# Patient Record
Sex: FEMALE | Race: BLACK OR AFRICAN AMERICAN | NOT HISPANIC OR LATINO | Employment: UNEMPLOYED | URBAN - METROPOLITAN AREA
[De-identification: names, ages, dates, MRNs, and addresses within clinical notes are randomized per-mention and may not be internally consistent; named-entity substitution may affect disease eponyms.]

---

## 2017-10-01 ENCOUNTER — HISTORICAL (OUTPATIENT)
Dept: ADMINISTRATIVE | Facility: HOSPITAL | Age: 47
End: 2017-10-01

## 2017-10-04 LAB — FINAL CULTURE: NORMAL

## 2019-07-09 ENCOUNTER — HISTORICAL (OUTPATIENT)
Dept: ADMINISTRATIVE | Facility: HOSPITAL | Age: 49
End: 2019-07-09

## 2022-04-09 ENCOUNTER — HISTORICAL (OUTPATIENT)
Dept: ADMINISTRATIVE | Facility: HOSPITAL | Age: 52
End: 2022-04-09

## 2022-04-29 VITALS
BODY MASS INDEX: 38.02 KG/M2 | HEIGHT: 66 IN | BODY MASS INDEX: 37.91 KG/M2 | DIASTOLIC BLOOD PRESSURE: 89 MMHG | HEIGHT: 66 IN | WEIGHT: 235.88 LBS | SYSTOLIC BLOOD PRESSURE: 152 MMHG | WEIGHT: 236.56 LBS

## 2022-05-02 NOTE — HISTORICAL [facility]
This is a historical note converted from [employer]. Formatting and pictures may have been removed.  Please reference Royer for original formatting and attached multimedia. Procedure Name  Date/Time:7/9/2019 11:16:53  Procedure:??Left?de Quervains tenosynovitis injection  Indications:??Diagnostic and Therapeutic Indication - decrease pain, increase range of motion and improve quality of life  RISKS: Possible complications with injection include?bleeding, infection (0.01%), tendon rupture, steroid flare, fat pad or soft tissue atrophy, skin depigmentation, and vasovagal response. ?(Steroid flair treatment is rest, ice, NSAIDs and resolves in 24-36 hours.)  Consent:???Procedure, risks, benefits, & alternatives explained to patient, who voiced understanding and agreed to proceed with procedure. ?Consent signed and?scanned into the medical record. No absolute contraindications (cellulitis overlying joint, infection, lack of informed consent, allergy to injection mediation, mary protein or egg allergy for sodium hyaluronate, or history of steroid flare) or relative contraindications (brittle or out of control DM HgA1c > 10, coagulopathy INR > 3.5, previous joint replacement, or history of AVN).  Description:?Time out performed. The patient was prepped?using Chlorhexidine scrub after the appropriate?identification of anatomic landmarks.? Sterile needle used (size # 21 gauge, length 1.5 inch)?Topical anesthetic of ethyl chloride was used.? ?1?mL of 1% lidocaine plain with 40 mg of Kenalog injected.  Complications:?None?  EBL:??None  Post Procedure:?Patient reports improvement in pain and ROM. Patient alert, moving all extremities. ?Good peripheral pulses, no signs of vascular compromise, range of motion intact. ?Patient tolerated procedure well. ?Aftercare instructions were given to patient at time of discharge.??Relative rest for 3 days - avoiding excessive activity. ?Pendulum stretching exercises followed by stretching  exercises daily?starting on day 4.? Place ice on area for 15 minutes every 4 to 6 hours.??Tylenol 1000mg twice a day or ibuprofen 600 mg three times per day for next 3-4 days if not on medication already. ?Protect the area for the next 1-8 hours if anesthetic was used. ?Avoid excessive activity for next 3 to 4 weeks.?ER precautions for fever, severe joint pain, or allergic reaction or other new symptoms related to joint injection.

## 2022-05-02 NOTE — HISTORICAL [facility]
This is a historical note converted from Royer. Formatting and pictures may have been removed.  Please reference Royer for original formatting and attached multimedia. Chief Complaint  Referred for Lt wrist/ Thumb tendonitis  History of Present Illness  49?yo?female?non-smoker?presents to ortho clinic for?initial visit?for?left?hand pain.? Patient points to??thumb, lateral?wrist radial?side. Patient dominate hand:? ?right  Today:? New patient referral for left thumb, wrist pain  Onset:??several months??progressively worsening  Current pain level: 10/10??without pain medication. Quality described as??sharp?.? Patient?denies?use of pain medication today.?  Medications r/t complaint: Patient reports using?RX / OTC?medications in past including:?OTC pain relievers,?gabapentin, Norco?RX per PCP. ?Currently taking?OTC Aleve??PRN?pain with?mild?relief.?  Modifying Factors: ?Worse with/after activity;Improved with rest;??No stiffness??  Injury:?Denies  Previous treatment: HEP?denies?; RX NSAIDs, PT??denies ?, CSI denies  Associated Symptoms:?No Crepitus/Grinding; ?Numbness/ tingling? left hand;??Swelling noted ;?No skin changes;?Weakness of ?left hand;?Mild decrease in ROM;?difficulty sleeping at night s/t pain  Activity:?Sedentary, full ADLs;?Pain interferes with function/daily activity (mild)  Family History:?Family history of arthritis  PMH:? denies CVD; denies DM ; denies RA; denies gout; denies RX anticoagulants; denies intolerance to NSAIDs s/t GI issues; denies ?intolerance to NSAIDs s/t kidney disease  PCP:?? 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  Note:? none  Review of Systems  Constitutional:No fever;No chills;No weight loss  CV:No swelling;No edema  GI:No urinary retention;No urinary incontinence  :No fecal incontinence  Skin:No rash;No wound  Neuro:No numbness/tingling;No weakness;No saddle anesthesia  MSK: As  above  Psych:No depression;No anxiety  Heme/Lymph:No easy bruising;No easy bleeding;No lymphadenopathy  Immuno:No MRSA history  Physical Exam  Vitals & Measurements  HR:?79(Peripheral)? RR:?18? BP:?152/89?  HT:?167.74?cm? WT:?107.0?kg? BMI:?38.03?  MSK:LEFT? HAND/ WRIST  General: No apparent distress;?obese  Inspection:?No masses/cyst/nodules,no deformity, no skin discoloration, no contracture;?no scars; no muscle atrophy to the thenar eminence or intrinsic muscles of the first web space  Palpation:?TTP De?Quervains tendon?left lateral wrist?radial side,?CMC joint?left hand;??No swelling;No bony enlargement  ROM:?  ?????Open/closure of hand:?no abnormalities, movement is full and smooth  Strength:??  ????? strength:?Reduced? L>R  Special Tests:  ?????Trigger finger presence:??Negative  ?????Phalen test:Negative?  ?????Tinel test:?Negative?  ?????Don carpal compression:Negative  ?????Finkelstein test:?Positive? left  Neurovascular:?Intact; 2+?distal pulse, sensation intact to light touch  Neuro/Psych: Awake, Alert, Oriented; Normal mood and affect  Lymphatic: No LAD  Skin and Soft Tissue: No bruising, No ecchymosis; No rash; Skin intact  Cardiovascular: RRR  Assessment/Plan  1.?De Quervains tenosynovitis, left?M65.4  ?1.? Discussed with patient diagnosis and treatment recommendations.? Handout given.  2.? Imaging:?Radiological studies ordered, reviewed?and independently interpreted by me; Discussed with patient  3.? Treatment plan: Conservative treatment to include ?oral glucosamine 1500 mg/day; Topical capsaicin as needed; Hot or cold therapy; Adequate vitamin C/D intake  4.? Procedure:?Discussed CSI injections as treatment options; since conservative measures did not improve symptoms patient consented for CSI today  5.? Activity:?Activity as tolerated; activity modifications as necessary  6.? Therapy:Forearm-based thumb spica splint with the interphalangeal joint free according to patient preference  7.?  Medication: Treatment with? ?acetaminophen 1000 mg three times daily; Medication precautions given;? continue medications as RX per PCP; RX meloxicam 7.5 mg daily x 30 days for symptom relief; medication precautions given.  8.? RTC:?3 months  9.? Additional work-up: Outside records from referral source reviewed.  Ordered:  Lidocaine inj., 1 mL, form: Injection, Intra-Articular, Once, first dose 19 11:08:00 CDT, stop date 19 11:08:00 CDT  triamcinolone, 40 mg, form: Injection, Intra-Articular, Once, first dose 19 11:08:00 CDT, stop date 19 11:08:00 CDT  Clinic Follow up, *Est. 10/09/19 3:00:00 CDT, Order for future visit, De Quervains tenosynovitis, left, [facility]  Office/Outpatient Visit Level 4 New [id number] PC, De Quervains tenosynovitis, left, [facility] 25, 19 11:08:00 CDT  ?  2.?Osteoarthritis of carpometacarpal joint of left thumb?M18.12  ?same as above  ?  3.?BMI 38.0-38.9,adult?Z68.38  ?Patient educated that diet modifications with low impact exercises as tolerated for reduction in BMI would improve overall treatment and outcome.?  ?  Orders:  Injections Tendon Sheath/lig [id number] PC, 19 11:08:00 CDT, [facility], Routine, 19 11:08:00 CDT  XR Wrist Left Minimum 3 Views, Routine, 19 10:42:00 CDT, Pain, None, Ambulatory, Rad Type, Wrist pain, left, Not Scheduled, 19 10:42:00 CDT  Referrals  Clinic Follow up, *Est. 10/09/19 3:00:00 CDT, Order for future visit, De Quervains tenosynovitis, left, [facility]   Problem List/Past Medical History  Ongoing  BMI 38.0-38.9,adult  Obesity  Historical  No qualifying data  Procedure/Surgical History   section  Hernia repair   Medications  Kenalog-40 injectable suspension, 40 mg= 1 mL, Intra-Articular, Once  lidocaine 1% PF 2ml inj, 1 mL, Intra-Articular, Once  Allergies  No Known Allergies  Social History  Tobacco  Never (less than 100 in lifetime), N/A, 2019  Health Maintenance  Health  Maintenance  ???Pending?(in the next year)  ??? ??OverDue  ??? ? ? ?Depression Screening due??06/29/17??and every 1??year(s)  ??? ? ? ?Alcohol Misuse Screening due??01/01/19??and every 1??year(s)  ??? ? ? ?Cervical Cancer Screening due??04/13/19??and every 3??year(s)  ??? ??Due?  ??? ? ? ?ADL Screening due??07/09/19??and every 1??year(s)  ??? ? ? ?Diabetes Screening due??07/09/19??and every?  ??? ? ? ?Tetanus Vaccine due??07/09/19??and every 10??year(s)  ??? ??Due In Future?  ??? ? ? ?Obesity Screening not due until??01/01/20??and every 1??year(s)  ??? ? ? ?Blood Pressure Screening not due until??07/08/20??and every 1??year(s)  ??? ? ? ?Body Mass Index Check not due until??07/08/20??and every 1??year(s)  ???Satisfied?(in the past 1 year)  ??? ??Satisfied?  ??? ? ? ?Blood Pressure Screening on??07/09/19.??Satisfied by Amairani Edwards LPN  ??? ? ? ?Body Mass Index Check on??07/09/19.??Satisfied by Amairani Edwards LPN  ??? ? ? ?Obesity Screening on??07/09/19.??Satisfied by Amairani Edwards LPN  ?  Diagnostic Results  07/09/2019 10:47 CDT XR Hand Thumb Left Minimum 2 Views  Radiology Report  Left thumb 2 views.  HISTORY: Pain.  FINDINGS: Examination reveals normal mineralization of the visualized  osseous structures articular spaces are essentially preserved with  smooth articular surfaces with minimal degenerative changes of the  carpometacarpal joint.  IMPRESSION: Degenerative changes  ?   XR left wrist obtained 7/9/19; no acute findings; awaiting radiologist findings.

## 2023-04-06 DIAGNOSIS — G89.29 CHRONIC PAIN OF LEFT KNEE: Primary | ICD-10-CM

## 2023-04-06 DIAGNOSIS — M25.562 CHRONIC PAIN OF LEFT KNEE: Primary | ICD-10-CM

## 2023-06-26 ENCOUNTER — HOSPITAL ENCOUNTER (OUTPATIENT)
Dept: RADIOLOGY | Facility: HOSPITAL | Age: 53
Discharge: HOME | End: 2023-06-26
Attending: STUDENT IN AN ORGANIZED HEALTH CARE EDUCATION/TRAINING PROGRAM
Payer: MEDICAID

## 2023-06-26 ENCOUNTER — OFFICE VISIT (OUTPATIENT)
Dept: ORTHOPEDICS | Facility: CLINIC | Age: 53
End: 2023-06-26
Payer: MEDICAID

## 2023-06-26 VITALS
DIASTOLIC BLOOD PRESSURE: 82 MMHG | SYSTOLIC BLOOD PRESSURE: 145 MMHG | WEIGHT: 248 LBS | BODY MASS INDEX: 39.86 KG/M2 | HEART RATE: 79 BPM | HEIGHT: 66 IN

## 2023-06-26 DIAGNOSIS — M17.12 PRIMARY OSTEOARTHRITIS OF LEFT KNEE: Primary | ICD-10-CM

## 2023-06-26 DIAGNOSIS — M25.562 LEFT KNEE PAIN, UNSPECIFIED CHRONICITY: ICD-10-CM

## 2023-06-26 DIAGNOSIS — M22.2X2 PATELLOFEMORAL PAIN SYNDROME OF LEFT KNEE: ICD-10-CM

## 2023-06-26 PROCEDURE — 73564 X-RAY EXAM KNEE 4 OR MORE: CPT | Mod: TC,LT

## 2023-06-26 PROCEDURE — 99213 OFFICE O/P EST LOW 20 MIN: CPT | Mod: PBBFAC

## 2023-06-26 RX ORDER — PREDNISONE 5 MG/1
5 TABLET ORAL 2 TIMES DAILY
COMMUNITY
Start: 2023-05-23

## 2023-06-26 RX ORDER — ERGOCALCIFEROL 1.25 MG/1
50000 CAPSULE ORAL
COMMUNITY
Start: 2023-04-19

## 2023-06-26 RX ORDER — MUPIROCIN 20 MG/G
OINTMENT TOPICAL 2 TIMES DAILY
COMMUNITY
Start: 2023-03-21

## 2023-06-26 RX ORDER — AMLODIPINE BESYLATE 5 MG/1
5 TABLET ORAL
COMMUNITY
Start: 2023-01-26

## 2023-06-26 RX ORDER — ACETAMINOPHEN AND CODEINE PHOSPHATE 300; 30 MG/1; MG/1
1 TABLET ORAL EVERY 6 HOURS PRN
COMMUNITY
Start: 2023-04-19

## 2023-06-26 RX ORDER — DICLOFENAC SODIUM 10 MG/G
2 GEL TOPICAL 4 TIMES DAILY PRN
Qty: 100 G | Refills: 3 | Status: SHIPPED | OUTPATIENT
Start: 2023-06-26 | End: 2023-09-24

## 2023-06-26 RX ORDER — NYSTATIN 100000 U/G
CREAM TOPICAL 2 TIMES DAILY
COMMUNITY
Start: 2023-01-10

## 2023-06-26 RX ORDER — MELOXICAM 15 MG/1
15 TABLET ORAL DAILY
Qty: 10 TABLET | Refills: 0 | Status: SHIPPED | OUTPATIENT
Start: 2023-06-26

## 2023-06-26 RX ORDER — CLONIDINE HYDROCHLORIDE 0.1 MG/1
0.1 TABLET ORAL 2 TIMES DAILY
COMMUNITY
Start: 2023-06-20

## 2023-06-26 RX ORDER — TRIAMCINOLONE ACETONIDE 1 MG/G
CREAM TOPICAL 2 TIMES DAILY
COMMUNITY
Start: 2023-01-10

## 2023-06-26 RX ORDER — FLUCONAZOLE 150 MG/1
150 TABLET ORAL ONCE
COMMUNITY
Start: 2023-05-23

## 2023-06-26 NOTE — PROGRESS NOTES
"Subjective:    Patient ID: Melba Dong is a 53 y.o. female  who presented to [facility] for new visit.    Chief Complaint: Pain of the Left Knee    History of Present Illness:    Melba Dong is a 53-year-old female who presents for evaluation of left knee pain.  She has been experiencing this pain for the past 3 years.  The pain started is mild when she is not return to work as a  but says over the past 3 years it has gotten progressively worse.  She denies any trauma.  She went to her PCP who has prescribed her Tylenol No. 3 which she has been taken but says that does not provide her with any relief.  She has not tried any other treatment thus far.    Knee Review of Systems:  Swelling?  Yes  Instability?  No  Mechanical sx?  No  <30 min AM stiffness? Yes  Limited ROM? Yes  Fever/Chills? No    Objective:      Physical Exam:    BP (!) 145/82 Comment: Pt states she just started a BP medication.  Pulse 79   Ht 5' 6" (1.676 m)   Wt 112.5 kg (248 lb)   BMI 40.03 kg/m²     Appearance:  limping  FWB  Alignment: Left: mild valgus Right: mild valgus   Soft tissue swelling: Left: no Right: no  Effusion: Left:  Negative Right: Negative  Erythema: Left no Right: no  Ecchymosis: Left: no Right: no  Atrophy: Left: no Right: no    Palpation:  Knee Tenderness: Left: Lateral joint line and Patella Right: None    Range of motion:  Flexion (140): Left:  120 Right: 120  Extension (0): Left: 0 Right: 0    Strength:  Extension: Left 5/5  Pain: No     Right 5/5 Pain: No  Flexion: Left 5/5 Pain: No Right   5/5 Pain: No    Special Tests:  Ballotable Effusion:Left: Negative Right: Negative   Fluid Wave: Left: Negative Right: Negative   Crepitus: Left: Positive Right: Positive   Patellar grind test: Left: Positive  Right: Negative  Apprehension test: Left: Negative Right: Negative   Varus: @ 0, Left Negative Right: Negative.  @ 30, Left Negative  Right Negative   Valgus: @ " 0, Left Negative Right: Negative.  @ 30, Left Negative  Right Negative  Lachman: Left: Negative Right: Negative   Ant Drawer: Left: Negative Right: Negative   Posterior Drawer: Left: Negative Right: Negative   Ignacia: Left: Negative Right: Negative     General appearance: NAD  Peripheral pulses: normal bilaterally   Sensation: normal    Labs:  Last A1c: The patient doesn't have any registry metric data available     Imaging:   Previous images performed. Images not available. Impression read.  X-rays ordered and performed today: Yes  # of views: 4 Laterality: left  My Interpretation:  There is mild joint space narrowing of the medial compartment with osteophytosis of the mediolateral compartments.  There is significant joint space narrowing and osteophytosis of the patellofemoral compartment.  No fracture or dislocation.    Assessment:     Encounter Diagnoses   Code Name Primary?    M17.12 Primary osteoarthritis of left knee Yes    M22.2X2 Patellofemoral pain syndrome of left knee      Plan:     Orders Placed This Encounter   Procedures    X-Ray Knee Complete 4 or More Views Left     Standing Status:   Future     Number of Occurrences:   1     Standing Expiration Date:   6/26/2024     Order Specific Question:   May the Radiologist modify the order per protocol to meet the clinical needs of the patient?     Answer:   Yes     Order Specific Question:   Release to patient     Answer:   Immediate     Medications Ordered This Encounter   Medications    diclofenac sodium (VOLTAREN) 1 % Gel     Sig: Apply 2 g topically 4 (four) times daily as needed (pain). Do not exceed 32 grams/day: do not to exceed 8 grams/day/single joint of upper extremities; do not to exceed 16 grams/day/single joint of lower extremities.  Please request refill of this medication from your PCP.     Dispense:  100 g     Refill:  3    meloxicam (MOBIC) 15 MG tablet     Sig: Take 1 tablet (15 mg total) by mouth once daily. Please request refill of this  medication from your PCP.     Dispense:  10 tablet     Refill:  0     Please request refill of this medication from your PCP.     MDM: Prior external referring provider notes reviewed. Prior external referring provider studies reviewed.   Dx: left Knee Osteoarthritis and patellofemoral pain.    Treatment Plan: Discussed with patient diagnosis, prognosis, and treatment recommendations. Education provided.    Home physical therapy exercise handouts provided to patient.   formal PT script provided to patient. You may take this script to whichever physical therapist you would like to go to.   RX NSAID - see med list  topical hot or cold therapy  Over the counter NSAID and/or tylenol provided you do not have contraindications such as but not limited to liver or kidney disease or uncontrolled blood pressure. If you're doctors have told you to to not take them based on your health, do not take them.   oral glucosamine 1500 mg/day.  topical capsaicin as needed  Using a brace provided to you here or from your local pharmacy or durable medical equipment (DME) store.   Imaging: radiological studies ordered and independently reviewed; discussed with patient; pending radiologist interpretation.   Weight Management: is paramount. maintain healthy weight of a bmi of 24.9 or less..   Procedure: Discussed CSI/VSI as treatment options; discussed CSI vs VS injections as treatment options in future if conservative measures do not improve symptoms.  Activity: Activity as tolerated; HEP to include aerobic conditioning and strength training with non-painful activity. ROM/STG exercises. Proper footware; assistive devises to avoid limping.   Therapy: Physical Therapy  Medication: first line treatment with daily acetaminophen. Up to 1000 mg three times daily can be taken; medication precautions given., start meloxicam 15 mg daily for short course; medication precautions given, and topical NSAIDs prescribed; medication precautions given.  Please see your primary care physician for further refills.  RTC: PRN; call if any issues.       HTN:   Nonsteroidal anti-inflammatory drugs (NSAIDs) may disrupt control of blood pressure in hypertensive patients and increase their risk of morbidity, mortality, and the costs of care. In general, people with hypertension should use acetaminophen or possibly aspirin for over-the-counter pain relief. Discuss with your primary healthcare provider if the use of NSAIDs is appropriate for you.